# Patient Record
Sex: FEMALE | Race: WHITE | NOT HISPANIC OR LATINO | Employment: PART TIME | URBAN - METROPOLITAN AREA
[De-identification: names, ages, dates, MRNs, and addresses within clinical notes are randomized per-mention and may not be internally consistent; named-entity substitution may affect disease eponyms.]

---

## 2021-11-10 NOTE — PROGRESS NOTES
EMG and Nerve Conduction Studies    The complete report includes the data sheets.     Date of Study: November 15, 2021    Referring Provider DR. BOSWELL    History:RUE-NUMBNESS/TINGLING    Results:    1.  The right median sensory distal latency is within normal limits the right median palmar latency is prolonged above the upper limit of normal and is significantly prolonged compared to the ulnar palmar latency.  The right median motor nerve conduction study is normal.    2.  The ulnar sensory and motor nerve conduction studies were normal.    3.  EMG of the muscles examined in the right C5-T1 myotomes were normal.    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  There is no evidence of focal ulnar neuropathy at the elbow or neurogenic change in any muscle examined in the right upper extremity in the C5-T1 myotomes.      Electronically signed by :    Joseph Seipel, M.D.  November 15, 2021

## 2021-11-15 ENCOUNTER — PROCEDURE VISIT (OUTPATIENT)
Dept: NEUROLOGY | Facility: CLINIC | Age: 31
End: 2021-11-15

## 2021-11-15 VITALS
SYSTOLIC BLOOD PRESSURE: 111 MMHG | WEIGHT: 183 LBS | BODY MASS INDEX: 33.68 KG/M2 | HEART RATE: 97 BPM | TEMPERATURE: 97.1 F | DIASTOLIC BLOOD PRESSURE: 78 MMHG | HEIGHT: 62 IN

## 2021-11-15 DIAGNOSIS — G56.03 BILATERAL CARPAL TUNNEL SYNDROME: ICD-10-CM

## 2021-11-15 PROBLEM — Z79.899 LONG-TERM USE OF HIGH-RISK MEDICATION: Status: ACTIVE | Noted: 2021-09-23

## 2021-11-15 PROBLEM — G40.909 EPILEPSY UNDETERMINED AS TO FOCAL OR GENERALIZED (HCC): Status: ACTIVE | Noted: 2021-09-23

## 2021-11-15 PROCEDURE — 95886 MUSC TEST DONE W/N TEST COMP: CPT | Performed by: PSYCHIATRY & NEUROLOGY

## 2021-11-15 PROCEDURE — 95908 NRV CNDJ TST 3-4 STUDIES: CPT | Performed by: PSYCHIATRY & NEUROLOGY

## 2021-11-15 RX ORDER — NAPROXEN 375 MG/1
TABLET, DELAYED RELEASE ORAL
COMMUNITY
Start: 2021-10-09

## 2021-11-15 RX ORDER — CYCLOBENZAPRINE HCL 5 MG
5 TABLET ORAL
COMMUNITY
Start: 2021-09-29

## 2021-11-15 RX ORDER — ARIPIPRAZOLE 5 MG/1
5 TABLET ORAL DAILY
COMMUNITY
Start: 2021-10-07 | End: 2022-10-07

## 2021-11-15 RX ORDER — ONDANSETRON 4 MG/1
TABLET, FILM COATED ORAL
COMMUNITY
Start: 2021-10-25

## 2021-11-15 RX ORDER — LEVETIRACETAM 500 MG/1
500 TABLET ORAL
COMMUNITY
Start: 2021-09-23 | End: 2022-09-23

## 2021-11-15 RX ORDER — LIDOCAINE 50 MG/G
PATCH TOPICAL
COMMUNITY
Start: 2021-10-10

## 2021-11-15 RX ORDER — DULOXETIN HYDROCHLORIDE 60 MG/1
CAPSULE, DELAYED RELEASE ORAL
COMMUNITY
Start: 2021-06-11

## 2021-11-15 RX ORDER — FOLIC ACID 1 MG/1
2 TABLET ORAL DAILY
COMMUNITY
Start: 2021-10-25

## 2021-11-15 RX ORDER — SUMATRIPTAN 50 MG/1
TABLET, FILM COATED ORAL
COMMUNITY
Start: 2021-10-25

## 2021-11-15 RX ORDER — RIZATRIPTAN BENZOATE 10 MG/1
TABLET ORAL
COMMUNITY
Start: 2021-10-28

## 2021-11-15 RX ORDER — DOXYCYCLINE 100 MG/1
CAPSULE ORAL
COMMUNITY
Start: 2021-08-19

## 2021-11-15 RX ORDER — AMITRIPTYLINE HYDROCHLORIDE 10 MG/1
10 TABLET, FILM COATED ORAL DAILY
COMMUNITY
Start: 2021-05-28 | End: 2022-05-28

## 2021-11-15 RX ORDER — PSEUDOEPHEDRINE HCL 30 MG
TABLET ORAL
COMMUNITY
Start: 2021-10-09

## 2021-11-15 RX ORDER — METHOCARBAMOL 500 MG/1
500 TABLET, FILM COATED ORAL 3 TIMES DAILY
COMMUNITY
Start: 2021-11-14 | End: 2021-11-24

## 2021-11-15 RX ORDER — ERGOCALCIFEROL 1.25 MG/1
CAPSULE ORAL
COMMUNITY
Start: 2021-09-22

## 2022-08-29 ENCOUNTER — TREATMENT (OUTPATIENT)
Dept: PHYSICAL THERAPY | Facility: CLINIC | Age: 32
End: 2022-08-29

## 2022-08-29 DIAGNOSIS — G89.29 CHRONIC LOW BACK PAIN, UNSPECIFIED BACK PAIN LATERALITY, UNSPECIFIED WHETHER SCIATICA PRESENT: ICD-10-CM

## 2022-08-29 DIAGNOSIS — M46.1 SACROILIITIS: Primary | ICD-10-CM

## 2022-08-29 DIAGNOSIS — M54.50 CHRONIC LOW BACK PAIN, UNSPECIFIED BACK PAIN LATERALITY, UNSPECIFIED WHETHER SCIATICA PRESENT: ICD-10-CM

## 2022-08-29 PROCEDURE — 97110 THERAPEUTIC EXERCISES: CPT | Performed by: PHYSICAL THERAPIST

## 2022-08-29 PROCEDURE — 97161 PT EVAL LOW COMPLEX 20 MIN: CPT | Performed by: PHYSICAL THERAPIST

## 2022-08-29 NOTE — PROGRESS NOTES
Physical Therapy Initial Evaluation and Plan of Care    Patient: Leola Valenzuela   : 1990  Diagnosis/ICD-10 Code:  Sacroiliitis (HCC) [M46.1]  Referring practitioner: Wade Bill MD   Outcome Measure: Modified Oswestry:    Subjective Evaluation    History of Present Illness  Mechanism of injury: Pt reports to therapy with LBP that started several years ago following repeated falls on concrete. Pt reports that her pain has gradual worsened over the years and had an epidural a few months ago with little to no relief. Pt reports that she has numbness and tingling that terminated inferior to the knee. Pt reports getting decent sleep as long as she has a heating pad.       Aggravating factors: bending, sweeping, mopping, cleaning, physical activity. Prolonged sitting, prolonged standing, ambulation    Alleviating factors: sitting      PMHx:anxiety, depression, headaches, seizures- controlled with medication, hx of tobacco use, hx of marijuana use    Quality of life: good    Pain  Current pain ratin  At best pain ratin  At worst pain rating: 10    Patient Goals  Patient goals for therapy: increased motion, decreased pain, increased strength, independence with ADLs/IADLs, return to sport/leisure activities and improved balance             Objective        Special Questions  Patient is experiencing night pain, headaches and tinnitus.       Postural Observations  Seated posture: fair  Standing posture: fair        Palpation   Left   Hypertonic in the erector spinae, lumbar interspinals, lumbar paraspinals and quadratus lumborum.   Tenderness of the erector spinae, lumbar interspinals, lumbar paraspinals and quadratus lumborum.     Right   Hypertonic in the erector spinae, lumbar interspinals, lumbar paraspinals and quadratus lumborum. Tenderness of the erector spinae, lumbar interspinals, lumbar paraspinals and quadratus lumborum.     Tenderness     Left Hip   Tenderness in the PSIS.     Right Hip    Tenderness in the PSIS.     Neurological Testing     Sensation     Lumbar   Left   Intact: light touch    Right   Intact: light touch    Reflexes   Left   Patellar (L4): brisk (3+)  Achilles (S1): normal (2+)    Right   Patellar (L4): brisk (3+)  Achilles (S1): normal (2+)    Active Range of Motion     Additional Active Range of Motion Details  Flex:min impaired + pain  Ext:  WFL + pain  R SB: WFL + pain  L SB:WFL - pain  R ROT:WFL - pain  L ROT: WFL - pain    Strength/Myotome Testing     Left Hip   Planes of Motion   Flexion: 4  External rotation: 4-  Internal rotation: 4-    Right Hip   Planes of Motion   Flexion: 5  External rotation: 4+  Internal rotation: 4+    Left Knee   Flexion: 4  Extension: 4    Right Knee   Flexion: 5  Extension: 5    Left Ankle/Foot   Dorsiflexion: 4+  Plantar flexion: 4+    Right Ankle/Foot   Dorsiflexion: 5  Plantar flexion: 5    Additional Strength Details  Pain with all LLE MMT     Tests       Thoracic   Positive slump.     Lumbar     Left   Positive passive SLR and quadrant.   Negative crossed SLR.     Right   Positive quadrant.   Negative crossed SLR and passive SLR.     Left Pelvic Girdle/Sacrum   Positive: gapping.     Left Hip   Positive Gillet's.     Right Hip   Positive Gillet's.      General Comments     Lumbar Comments  LAD increased pain on BLE, L side gapping provided pain relief         Assessment & Plan     Assessment  Impairments: abnormal coordination, abnormal gait, abnormal muscle firing, abnormal muscle tone, abnormal or restricted ROM, activity intolerance, impaired balance, impaired physical strength, lacks appropriate home exercise program and pain with function  Functional Limitations: carrying objects, lifting, sleeping, walking, pulling, pushing, uncomfortable because of pain, moving in bed, sitting, standing, stooping, reaching behind back, reaching overhead and unable to perform repetitive tasks  Assessment details: Patient is a 32 y.o. year old female who  presents to therapy with sacroiliitis.  she presents with significant impairments including decreased lumbar ROM with pain, decreased BLE strength, decreased standing, sitting, walking , and activity tolerance, impaired Modified Oswestry score, and pain. Impairments affect ADL/IADL's, functional activities, recreational activities, and community activities. Pt will benefit from skilled physical therapy to address impairments, decrease pain and restore function.  Prognosis: good    Goals  Plan Goals: Pt presents to PT with symptoms consistent with sacroiliitis.  Pt would benefit from skilled PT intervention to address the deficits noted.     SHORT TERM GOALS: Time for Goal Achievement: 4 weeks    1.  Patient to be compliant and progression of HEP                             2.  Pain level < 8/10 at worst with mentioned activities to improve function  3.  Increased thoracic, lumbar and SIJ mobility to allow for increased lumbar AROM with less pain.  4.  Increased lumbar AROM to by 25% in all planes to allow for increased ease with sit-stand transfers and functional activities    LONG TERM GOALS: Time for Goal Achievement: 2 months  1.  Pt. to score < 25 % on Back Index  2.  Pain level < 5/10 with all listed activities to return to normal.  3.  Lumbar AROM to WFL to allow for return to household & recreational activities w/o increased symptoms  4.  (B) LE and lower abdominal strength to 5/5 to allow for pushing, pulling and activities to occur without pain (driving, sitting, household  & Job requirements)    Plan  Therapy options: will be seen for skilled therapy services  Planned modality interventions: cryotherapy, electrical stimulation/Russian stimulation, high voltage pulsed current (pain management), thermotherapy (hydrocollator packs), TENS, traction and ultrasound  Planned therapy interventions: abdominal trunk stabilization, ADL retraining, balance/weight-bearing training, body mechanics training, fine motor  coordination training, flexibility, functional ROM exercises, gait training, home exercise program, IADL retraining, joint mobilization, therapeutic activities, stretching, spinal/joint mobilization, strengthening, soft tissue mobilization, postural training, neuromuscular re-education, motor coordination training, manual therapy and transfer training  Duration in visits: 16  Treatment plan discussed with: patient        History # of Personal Factors and/or Comorbidities: HIGH (3+)  Examination of Body System(s): # of elements: LOW (1-2)  Clinical Presentation: UNSTABLE   Clinical Decision Making: LOW     Timed:         Manual Therapy:    5     mins  32648;     Therapeutic Exercise:    10     mins  03883;     Neuromuscular Basilio:        mins  45703;    Therapeutic Activity:         mins  09806;     Gait Training:           mins  00726;     Ultrasound:          mins  76733;    Ionto                                   mins   18676  Self Care                            mins   99916        Un-Timed:  Electrical Stimulation:         mins  55099 ( );  Dry Needling          mins self-pay  Traction          mins 05509  Low Eval     15     Mins  26422  Mod Eval          Mins  11260  High Eval                            Mins  81646  Re-Eval                               mins  45515        Timed Treatment:   15   mins   Total Treatment:     30   mins  PT SIGNATURE: Raquel Camarena PT, DPT    DATE TREATMENT INITIATED: 8/29/2022    Initial Certification  Certification Period: 11/27/2022  I certify that the therapy services are furnished while this patient is under my care.  The services outlined above are required by this patient, and will be reviewed every 90 days.     PHYSICIAN: Wade Bill MD      DATE:     Please sign and return via fax to [phone].. Thank you, [facility].

## 2022-09-02 ENCOUNTER — TREATMENT (OUTPATIENT)
Dept: PHYSICAL THERAPY | Facility: CLINIC | Age: 32
End: 2022-09-02

## 2022-09-02 DIAGNOSIS — M54.50 CHRONIC LOW BACK PAIN, UNSPECIFIED BACK PAIN LATERALITY, UNSPECIFIED WHETHER SCIATICA PRESENT: ICD-10-CM

## 2022-09-02 DIAGNOSIS — G89.29 CHRONIC LOW BACK PAIN, UNSPECIFIED BACK PAIN LATERALITY, UNSPECIFIED WHETHER SCIATICA PRESENT: ICD-10-CM

## 2022-09-02 DIAGNOSIS — M46.1 SACROILIITIS: Primary | ICD-10-CM

## 2022-09-02 PROCEDURE — 97110 THERAPEUTIC EXERCISES: CPT | Performed by: PHYSICAL THERAPIST

## 2022-09-02 PROCEDURE — 97530 THERAPEUTIC ACTIVITIES: CPT | Performed by: PHYSICAL THERAPIST

## 2022-09-02 NOTE — PROGRESS NOTES
Physical Therapy Daily Progress Note    VISIT#: 2    Subjective   Leola Valenzuela reports that she was sore  Following her intial evaluation but has been doing well since  Pain Rating (0/10): 2    Objective     See Exercise, Manual, and Modality Logs for complete treatment.     Patient Education: progression of therapy and POC    Assessment/Plan   Pt progressed in functional core strengthening and stability with minimal to no increase in pain throughout the session. Pt was provided with an updated HEP.     Plan  Progress per Plan of Care and Progress strengthening /stabilization /functional activity            Timed:         Manual Therapy:         mins  08437;     Therapeutic Exercise:    25     mins  63031;     Neuromuscular Basilio:        mins  42652;    Therapeutic Activity:     15     mins  80024;     Gait Training:           mins  62467;     Ultrasound:          mins  62200;    Ionto                                   mins   52198  Self Care                            mins   19476    Un-Timed:  Electrical Stimulation:         mins  33080 ( );  Dry Needling          mins self-pay  Traction          mins 81563  Low Eval         Mins  35917  Mod Eval          Mins  50332  High Eval                            Mins  41320  Re-Eval                               mins  31909    Timed Treatment:   40   mins   Total Treatment:     40   mins    Raquel Camarena PT, DPT  Physical Therapist

## 2022-09-12 ENCOUNTER — TREATMENT (OUTPATIENT)
Dept: PHYSICAL THERAPY | Facility: CLINIC | Age: 32
End: 2022-09-12

## 2022-09-12 DIAGNOSIS — M46.1 SACROILIITIS: Primary | ICD-10-CM

## 2022-09-12 DIAGNOSIS — M54.50 CHRONIC LOW BACK PAIN, UNSPECIFIED BACK PAIN LATERALITY, UNSPECIFIED WHETHER SCIATICA PRESENT: ICD-10-CM

## 2022-09-12 DIAGNOSIS — G89.29 CHRONIC LOW BACK PAIN, UNSPECIFIED BACK PAIN LATERALITY, UNSPECIFIED WHETHER SCIATICA PRESENT: ICD-10-CM

## 2022-09-12 PROCEDURE — 97110 THERAPEUTIC EXERCISES: CPT | Performed by: PHYSICAL THERAPIST

## 2022-09-12 NOTE — PROGRESS NOTES
Physical Therapy Daily Progress Note    VISIT#: 3    Subjective   Leola Valenzuela reports that she is doing well today and is tolerating her exercises well  Pain Rating (0/10): 3    Objective     See Exercise, Manual, and Modality Logs for complete treatment.     Patient Education: progression of therapy and POC    Assessment/Plan   Pt continues to progress strengthening, stability and activity tolerance with pt continuing to increase the amount of exercises with minimal to no increase in pain    Plan  Progress per Plan of Care and Progress strengthening /stabilization /functional activity            Timed:         Manual Therapy:         mins  02658;     Therapeutic Exercise:    40     mins  91606;     Neuromuscular Basilio:        mins  31725;    Therapeutic Activity:          mins  98322;     Gait Training:           mins  43227;     Ultrasound:          mins  08786;    Ionto                                   mins   10435  Self Care                            mins   87516    Un-Timed:  Electrical Stimulation:         mins  00477 ( );  Dry Needling          mins self-pay  Traction          mins 99349  Low Eval          Mins  53061  Mod Eval          Mins  08056  High Eval                            Mins  64672  Re-Eval                               mins  81910    Timed Treatment:   40   mins   Total Treatment:     40   mins    Raquel Camarena PT, DPT  Physical Therapist

## 2022-09-14 ENCOUNTER — TREATMENT (OUTPATIENT)
Dept: PHYSICAL THERAPY | Facility: CLINIC | Age: 32
End: 2022-09-14

## 2022-09-14 DIAGNOSIS — M46.1 SACROILIITIS: Primary | ICD-10-CM

## 2022-09-14 DIAGNOSIS — G89.29 CHRONIC LOW BACK PAIN, UNSPECIFIED BACK PAIN LATERALITY, UNSPECIFIED WHETHER SCIATICA PRESENT: ICD-10-CM

## 2022-09-14 DIAGNOSIS — M54.50 CHRONIC LOW BACK PAIN, UNSPECIFIED BACK PAIN LATERALITY, UNSPECIFIED WHETHER SCIATICA PRESENT: ICD-10-CM

## 2022-09-14 PROCEDURE — 97110 THERAPEUTIC EXERCISES: CPT | Performed by: PHYSICAL THERAPIST

## 2022-09-14 PROCEDURE — 97530 THERAPEUTIC ACTIVITIES: CPT | Performed by: PHYSICAL THERAPIST

## 2022-09-14 NOTE — PROGRESS NOTES
Physical Therapy Daily Progress Note    VISIT#: 4    Subjective   Leola Valenzuela reports that she is doing well today with no new complaints. Pt reports that her back has been   Pain Rating (0/10): 1    Objective     See Exercise, Manual, and Modality Logs for complete treatment.     Patient Education: progression of therapy and POC    Assessment/Plan   Pt continues to progress functional core strengthening with greater amount of exercises performed in standing today as compared to previously.     Plan  Progress per Plan of Care and Progress strengthening /stabilization /functional activity            Timed:         Manual Therapy:         mins  60822;     Therapeutic Exercise:    35     mins  54888;     Neuromuscular Basilio:        mins  40427;    Therapeutic Activity:     10     mins  87651;     Gait Training:           mins  29152;     Ultrasound:          mins  27662;    Ionto                                   mins   48617  Self Care                            mins   96750    Un-Timed:  Electrical Stimulation:         mins  94899 ( );  Dry Needling          mins self-pay  Traction          mins 36529  Low Eval          Mins  07750  Mod Eval          Mins  49879  High Eval                            Mins  99968  Re-Eval                               mins  98192    Timed Treatment:   45   mins   Total Treatment:     45   mins    Raquel Camarena PT, DPT  Physical Therapist

## 2022-09-19 ENCOUNTER — TREATMENT (OUTPATIENT)
Dept: PHYSICAL THERAPY | Facility: CLINIC | Age: 32
End: 2022-09-19

## 2022-09-19 DIAGNOSIS — M54.50 CHRONIC LOW BACK PAIN, UNSPECIFIED BACK PAIN LATERALITY, UNSPECIFIED WHETHER SCIATICA PRESENT: ICD-10-CM

## 2022-09-19 DIAGNOSIS — G89.29 CHRONIC LOW BACK PAIN, UNSPECIFIED BACK PAIN LATERALITY, UNSPECIFIED WHETHER SCIATICA PRESENT: ICD-10-CM

## 2022-09-19 DIAGNOSIS — M46.1 SACROILIITIS: Primary | ICD-10-CM

## 2022-09-19 PROCEDURE — 97110 THERAPEUTIC EXERCISES: CPT | Performed by: PHYSICAL THERAPIST

## 2022-09-19 PROCEDURE — 97530 THERAPEUTIC ACTIVITIES: CPT | Performed by: PHYSICAL THERAPIST

## 2022-09-19 NOTE — PROGRESS NOTES
Physical Therapy Daily Progress Note    VISIT#: 5    Subjective   Leola Valenzuela reports that she is doing better and is going to try to rearrange her living room later today  Pain Rating (0/10): 0    Objective     See Exercise, Manual, and Modality Logs for complete treatment.     Patient Education: progression of therapy, POC, and proper lifting mechanics.     Assessment/Plan   Pt continues to progress functional core strengthening, BLE strengthening, and activity tolerance with minimal to no increase in pain throughout the session.     Plan  Progress per Plan of Care and Progress strengthening /stabilization /functional activity            Timed:         Manual Therapy:         mins  79730;     Therapeutic Exercise:    20     mins  02895;     Neuromuscular Basilio:        mins  97976;    Therapeutic Activity:     25     mins  32217;     Gait Training:           mins  75846;     Ultrasound:          mins  95990;    Ionto                                   mins   01389  Self Care                            mins   82000    Un-Timed:  Electrical Stimulation:         mins  46491 ( );  Dry Needling          mins self-pay  Traction          mins 40575  Low Eval          Mins  71152  Mod Eval          Mins  57358  High Eval                            Mins  42385  Re-Eval                               mins  43766    Timed Treatment:   45   mins   Total Treatment:     45   mins    Raquel Camarena PT, DPT  Physical Therapist

## 2022-09-22 ENCOUNTER — TELEPHONE (OUTPATIENT)
Dept: PHYSICAL THERAPY | Facility: CLINIC | Age: 32
End: 2022-09-22

## 2022-09-27 ENCOUNTER — TREATMENT (OUTPATIENT)
Dept: PHYSICAL THERAPY | Facility: CLINIC | Age: 32
End: 2022-09-27

## 2022-09-27 DIAGNOSIS — M54.50 CHRONIC LOW BACK PAIN, UNSPECIFIED BACK PAIN LATERALITY, UNSPECIFIED WHETHER SCIATICA PRESENT: ICD-10-CM

## 2022-09-27 DIAGNOSIS — M46.1 SACROILIITIS: Primary | ICD-10-CM

## 2022-09-27 DIAGNOSIS — G89.29 CHRONIC LOW BACK PAIN, UNSPECIFIED BACK PAIN LATERALITY, UNSPECIFIED WHETHER SCIATICA PRESENT: ICD-10-CM

## 2022-09-27 PROCEDURE — 97110 THERAPEUTIC EXERCISES: CPT | Performed by: PHYSICAL THERAPIST

## 2022-09-27 PROCEDURE — 97530 THERAPEUTIC ACTIVITIES: CPT | Performed by: PHYSICAL THERAPIST

## 2022-09-27 NOTE — PROGRESS NOTES
Re-Assessment / Progress Note    Patient: Leola Valenzuela   : 1990  Diagnosis/ICD-10 Code:  Sacroiliitis (HCC) [M46.1]  Referring practitioner: Wade Bill MD  Date of Initial Visit: Episode Type: THERAPY  Noted: 2022    Today's Date: 2022  Patient seen for 6 sessions.      Subjective:   Leola Valenzuela reports: that she is unable to perform forward flexion repeatedly and also is still having issues with prolonged standing but is improving overall tolerance with no pain at this time  Subjective Questionnaire: Oswestry:   Clinical Progress: improved  Home Program Compliance: Yes  Treatment has included: therapeutic exercise, neuromuscular re-education, manual therapy, therapeutic activity and moist heat    Subjective   Objective     Active Range of Motion     Additional Active Range of Motion Details  Flex:min impaired + pain  Ext:  WFL + pain  R SB: WFL- pain  L SB:WFL - pain  R ROT:WFL - pain  L ROT: WFL - pain    Strength/Myotome Testing     Left Hip   Planes of Motion   Flexion:5  External rotation: 5  Internal rotation: 4+    Right Hip   Planes of Motion   Flexion: 5  External rotation: 5  Internal rotation: 5    Left Knee   Flexion:5  Extension: 5    Right Knee   Flexion: 5  Extension: 5    Left Ankle/Foot   Dorsiflexion: 5  Plantar flexion:5    Right Ankle/Foot   Dorsiflexion: 5  Plantar flexion: 5    Tests     Lumbar     Left   Positive passive SLR and quadrant.   Negative crossed SLR.     Right   Negative crossed SLR and passive SLR.  quadrant.     Left Hip   Positive Gillet's.     Right Hip   Positive Gillet's.      General Comments     Lumbar Comments  LAD increased pain on BLE, L side gapping provided pain relief       Assessment/Plan       Assessment & Plan     Assessment  Assessment details: Patient is a 32 y.o. year old female who presents to therapy with sacroiliitis.At initial evaluation  she presented with significant impairments including decreased lumbar ROM with pain,  decreased BLE strength, decreased standing, sitting, walking , and activity tolerance, impaired Modified Oswestry score, and pain. Pt has demonstrated improvements in all areas with strength the most noted improvements, but pt also reports increased tolerance for sitting, standing, walking, and activity with her pain decreasing, but pt continues to have impairments in theses areas.  Impairments affect ADL/IADL's, functional activities, recreational activities, and community activities. Pt will benefit from continued skilled physical therapy to address impairments, decrease pain and restore function.  Prognosis: good    Goals  Plan Goals: Pt presents to PT with symptoms consistent with sacroiliitis.  Pt would benefit from skilled PT intervention to address the deficits noted.     SHORT TERM GOALS: Time for Goal Achievement: 4 weeks    1.  Patient to be compliant and progression of HEP - MET                            2.  Pain level < 8/10 at worst with mentioned activities to improve function- MET  3.  Increased thoracic, lumbar and SIJ mobility to allow for increased lumbar AROM with less pain.- MET  4.  Increased lumbar AROM to by 25% in all planes to allow for increased ease with sit-stand transfers and functional activities- MET    LONG TERM GOALS: Time for Goal Achievement: 2 months  1.  Pt. to score < 25 % on Back Index- NOT MET  2.  Pain level < 5/10 with all listed activities to return to normal.- NOT MET  3.  Lumbar AROM to WFL to allow for return to household & recreational activities w/o increased symptoms  4.  (B) LE and lower abdominal strength to 5/5 to allow for pushing, pulling and activities to occur without pain (driving, sitting, household  & Job requirements)- NOT MET      Progress toward previous goals: Partially Met  Recommendations: Continue as planned  Timeframe: 1 month  Prognosis to achieve goals: good    PT Signature: Raquel Camarena PT, DPT            [place] Lic. # [id number]        Based upon  review of the patient's progress and continued therapy plan, it is my medical opinion that Leloa Valenzuela should continue physical therapy treatment at [facility]  [address]  [place] [address] [phone].    Signature: __________________________________  Wade Bill MD    Timed:         Manual Therapy:         mins  19889;     Therapeutic Exercise:    30     mins  21751;     Neuromuscular Basilio:        mins  26097;    Therapeutic Activity:     15     mins  24330;     Gait Training:           mins  32723;     Ultrasound:          mins  98275;    Ionto                                   mins   59841  Self Care                            mins   75543        Un-Timed:  Electrical Stimulation:        mins  60102 ( );  Dry Needling          mins self-pay  Traction          mins 21817  Low Eval          Mins  90398  Mod Eval          Mins  16816  High Eval                            Mins  86367  Re-Eval                               mins  07726        Timed Treatment:   45   mins   Total Treatment:     45   mins

## 2022-10-04 ENCOUNTER — TREATMENT (OUTPATIENT)
Dept: PHYSICAL THERAPY | Facility: CLINIC | Age: 32
End: 2022-10-04

## 2022-10-04 DIAGNOSIS — G89.29 CHRONIC LOW BACK PAIN, UNSPECIFIED BACK PAIN LATERALITY, UNSPECIFIED WHETHER SCIATICA PRESENT: ICD-10-CM

## 2022-10-04 DIAGNOSIS — M54.50 CHRONIC LOW BACK PAIN, UNSPECIFIED BACK PAIN LATERALITY, UNSPECIFIED WHETHER SCIATICA PRESENT: ICD-10-CM

## 2022-10-04 DIAGNOSIS — M46.1 SACROILIITIS: Primary | ICD-10-CM

## 2022-10-04 PROCEDURE — 97530 THERAPEUTIC ACTIVITIES: CPT | Performed by: PHYSICAL THERAPIST

## 2022-10-04 PROCEDURE — 97110 THERAPEUTIC EXERCISES: CPT | Performed by: PHYSICAL THERAPIST

## 2022-10-04 NOTE — PROGRESS NOTES
Physical Therapy Daily Treatment Note    VISIT#: 7    Subjective   Leola Valenzuela reports that she is doing well today with no new complaints  Pain Rating (0/10): 1    Objective     See Exercise, Manual, and Modality Logs for complete treatment.     Patient Education: Progress of therapy and POC    Assessment/Plan  Pt continues to progress strengthening, stability, and activity tolerance with majority of exercises performed in standing with emphasis on core activation.     Plan  Progress per Plan of Care and Progress strengthening /stabilization /functional activity            Timed:         Manual Therapy:         mins  85594;     Therapeutic Exercise:    30     mins  98794;     Neuromuscular Basilio:        mins  37723;    Therapeutic Activity:     15     mins  81497;     Gait Training:           mins  71843;     Ultrasound:          mins  19952;    Ionto                                   mins   50590  Self Care                            mins   41990    Un-Timed:  Electrical Stimulation:         mins  51731 ( );  Dry Needling          mins self-pay  Traction          mins 62694  Low Eval          Mins  76534  Mod Eval          Mins  62816  High Eval                            Mins  74123  Re-Eval                               mins  54255    Timed Treatment:   45   mins   Total Treatment:     45   mins    Raquel Camarena PT, DPT  Physical Therapist

## 2022-10-06 ENCOUNTER — TREATMENT (OUTPATIENT)
Dept: PHYSICAL THERAPY | Facility: CLINIC | Age: 32
End: 2022-10-06

## 2022-10-06 DIAGNOSIS — G89.29 CHRONIC LOW BACK PAIN, UNSPECIFIED BACK PAIN LATERALITY, UNSPECIFIED WHETHER SCIATICA PRESENT: ICD-10-CM

## 2022-10-06 DIAGNOSIS — M54.50 CHRONIC LOW BACK PAIN, UNSPECIFIED BACK PAIN LATERALITY, UNSPECIFIED WHETHER SCIATICA PRESENT: ICD-10-CM

## 2022-10-06 DIAGNOSIS — M46.1 SACROILIITIS: Primary | ICD-10-CM

## 2022-10-06 PROCEDURE — 97530 THERAPEUTIC ACTIVITIES: CPT | Performed by: PHYSICAL THERAPIST

## 2022-10-06 PROCEDURE — 97112 NEUROMUSCULAR REEDUCATION: CPT | Performed by: PHYSICAL THERAPIST

## 2022-10-06 PROCEDURE — 97110 THERAPEUTIC EXERCISES: CPT | Performed by: PHYSICAL THERAPIST

## 2022-10-06 NOTE — PROGRESS NOTES
Physical Therapy Daily Treatment Note    VISIT#: 8    Subjective   Leola Valenzuela reports that she is doing well today and did well following her previous session  Pain Rating (0/10): 1    Objective     See Exercise, Manual, and Modality Logs for complete treatment.     Patient Education: Progress of therapy and POC    Assessment/Plan  Pt continues to progress functional core strengthening with increased difficulty of exercises performed with minimal to no increase in pain noted    Plan  Progress per Plan of Care and Progress strengthening /stabilization /functional activity            Timed:         Manual Therapy:         mins  76797;     Therapeutic Exercise:    15     mins  60195;     Neuromuscular Basilio:    10    mins  79107;    Therapeutic Activity:     15     mins  72512;     Gait Training:           mins  47795;     Ultrasound:          mins  75868;    Ionto                                   mins   90340  Self Care                            mins   33816    Un-Timed:  Electrical Stimulation:         mins  78090 ( );  Dry Needling          mins self-pay  Traction          mins 19690  Low Eval          Mins  42820  Mod Eval          Mins  07981  High Eval                            Mins  66479  Re-Eval                               mins  43760    Timed Treatment:   40   mins   Total Treatment:     40   mins    Raquel Camarena PT, DPT  Physical Therapist

## 2022-10-17 ENCOUNTER — TELEPHONE (OUTPATIENT)
Dept: PHYSICAL THERAPY | Facility: OTHER | Age: 32
End: 2022-10-17

## 2022-10-20 ENCOUNTER — TREATMENT (OUTPATIENT)
Dept: PHYSICAL THERAPY | Facility: CLINIC | Age: 32
End: 2022-10-20

## 2022-10-20 DIAGNOSIS — G89.29 CHRONIC LOW BACK PAIN, UNSPECIFIED BACK PAIN LATERALITY, UNSPECIFIED WHETHER SCIATICA PRESENT: ICD-10-CM

## 2022-10-20 DIAGNOSIS — M54.50 CHRONIC LOW BACK PAIN, UNSPECIFIED BACK PAIN LATERALITY, UNSPECIFIED WHETHER SCIATICA PRESENT: ICD-10-CM

## 2022-10-20 DIAGNOSIS — M46.1 SACROILIITIS: Primary | ICD-10-CM

## 2022-10-20 PROCEDURE — 97110 THERAPEUTIC EXERCISES: CPT | Performed by: PHYSICAL THERAPIST

## 2022-10-20 PROCEDURE — 97530 THERAPEUTIC ACTIVITIES: CPT | Performed by: PHYSICAL THERAPIST

## 2022-10-20 PROCEDURE — 97112 NEUROMUSCULAR REEDUCATION: CPT | Performed by: PHYSICAL THERAPIST

## 2022-10-20 NOTE — PROGRESS NOTES
Physical Therapy Daily Treatment Note    VISIT#: 9    Subjective   Leola Valenzuela reports that she is doing well today with no new complaints  Pain Rating (0/10): 1    Objective     See Exercise, Manual, and Modality Logs for complete treatment.     Patient Education: Progress of therapy and POC    Assessment/Plan  Pt continues to progress strengthening, stability, and activity tolerance in standing with rest breaks taken intermittently to minimize fatigue    Plan  Progress per Plan of Care and Progress strengthening /stabilization /functional activity            Timed:         Manual Therapy:         mins  48379;     Therapeutic Exercise:    15     mins  05948;     Neuromuscular Basilio:    10    mins  33470;    Therapeutic Activity:     15     mins  72159;     Gait Training:           mins  84714;     Ultrasound:          mins  83226;    Ionto                                   mins   80950  Self Care                            mins   32742    Un-Timed:  Electrical Stimulation:         mins  76605 ( );  Dry Needling          mins self-pay  Traction          mins 12121  Low Eval          Mins  95843  Mod Eval          Mins  71193  High Eval                            Mins  42988  Re-Eval                               mins  93451    Timed Treatment:   40   mins   Total Treatment:     40   mins    Raquel Camarena PT, DPT  Physical Therapist

## 2022-10-24 ENCOUNTER — TREATMENT (OUTPATIENT)
Dept: PHYSICAL THERAPY | Facility: CLINIC | Age: 32
End: 2022-10-24

## 2022-10-24 DIAGNOSIS — M54.50 CHRONIC LOW BACK PAIN, UNSPECIFIED BACK PAIN LATERALITY, UNSPECIFIED WHETHER SCIATICA PRESENT: ICD-10-CM

## 2022-10-24 DIAGNOSIS — G89.29 CHRONIC LOW BACK PAIN, UNSPECIFIED BACK PAIN LATERALITY, UNSPECIFIED WHETHER SCIATICA PRESENT: ICD-10-CM

## 2022-10-24 DIAGNOSIS — M46.1 SACROILIITIS: Primary | ICD-10-CM

## 2022-10-24 PROCEDURE — 97110 THERAPEUTIC EXERCISES: CPT | Performed by: PHYSICAL THERAPIST

## 2022-10-24 PROCEDURE — 97112 NEUROMUSCULAR REEDUCATION: CPT | Performed by: PHYSICAL THERAPIST

## 2022-10-24 PROCEDURE — 97530 THERAPEUTIC ACTIVITIES: CPT | Performed by: PHYSICAL THERAPIST

## 2022-10-24 NOTE — PROGRESS NOTES
Physical Therapy Daily Treatment Note    VISIT#: 10    Subjective   Leola Valenzuela reports that she has increased pain following increased activity and flexion over the weekend.   Pain Rating (0/10): 6    Objective     See Exercise, Manual, and Modality Logs for complete treatment.     Patient Education: Progress of therapy and POC    Assessment/Plan  Pt with higher pain levels today as compared to previous session with pt starting on the Nustep followed by functional core stabilization exercises in supine as she progressed to standing exercises again    Plan  Progress per Plan of Care and Progress strengthening /stabilization /functional activity            Timed:         Manual Therapy:         mins  37921;     Therapeutic Exercise:    15     mins  66369;     Neuromuscular Basilio:    15    mins  67202;    Therapeutic Activity:     15     mins  80732;     Gait Training:           mins  22035;     Ultrasound:          mins  21394;    Ionto                                   mins   74678  Self Care                            mins   47020    Un-Timed:  Electrical Stimulation:         mins  67845 ( );  Dry Needling          mins self-pay  Traction          mins 58049  Low Eval          Mins  71936  Mod Eval          Mins  26290  High Eval                            Mins  40822  Re-Eval                               mins  47558    Timed Treatment:   45   mins   Total Treatment:     45   mins    Raquel Camarena PT, DPT  Physical Therapist

## 2022-11-02 ENCOUNTER — TREATMENT (OUTPATIENT)
Dept: PHYSICAL THERAPY | Facility: CLINIC | Age: 32
End: 2022-11-02

## 2022-11-02 DIAGNOSIS — G89.29 CHRONIC LOW BACK PAIN, UNSPECIFIED BACK PAIN LATERALITY, UNSPECIFIED WHETHER SCIATICA PRESENT: ICD-10-CM

## 2022-11-02 DIAGNOSIS — M54.50 CHRONIC LOW BACK PAIN, UNSPECIFIED BACK PAIN LATERALITY, UNSPECIFIED WHETHER SCIATICA PRESENT: ICD-10-CM

## 2022-11-02 DIAGNOSIS — M46.1 SACROILIITIS: Primary | ICD-10-CM

## 2022-11-02 PROCEDURE — 97110 THERAPEUTIC EXERCISES: CPT | Performed by: PHYSICAL THERAPIST

## 2022-11-02 PROCEDURE — 97530 THERAPEUTIC ACTIVITIES: CPT | Performed by: PHYSICAL THERAPIST

## 2022-11-02 NOTE — PROGRESS NOTES
Physical Therapy Daily Treatment Note    VISIT#: 11    Subjective   Leola Valenzuela reports that she is continuing to have similar pain as her last session and is unsure why with pressure noted in her back.   Pain Rating (0/10): 5    Objective     See Exercise, Manual, and Modality Logs for complete treatment.     Patient Education: Progress of therapy and POC    Assessment/Plan  Pt performed greater amounts of gravity limited exercise for functional core strengthening, with progression into greater standing activities with minimal to no increase in pain throughout the day. Pt billed for one-on-one time.     Plan  Progress per Plan of Care and Progress strengthening /stabilization /functional activity            Timed:         Manual Therapy:         mins  87013;     Therapeutic Exercise:   15     mins  78386;     Neuromuscular Basilio:        mins  06187;    Therapeutic Activity:     15     mins  64254;     Gait Training:           mins  24614;     Ultrasound:          mins  99218;    Ionto                                   mins   14875  Self Care                            mins   96336    Un-Timed:  Electrical Stimulation:         mins  78259 ( );  Dry Needling          mins self-pay  Traction          mins 28595  Low Eval          Mins  43156  Mod Eval          Mins  61484  High Eval                            Mins  15123  Re-Eval                               mins  24751    Timed Treatment:   30   mins   Total Treatment:     30   mins    Raquel Camarena PT, DPT  Physical Therapist

## 2022-11-14 ENCOUNTER — TREATMENT (OUTPATIENT)
Dept: PHYSICAL THERAPY | Facility: CLINIC | Age: 32
End: 2022-11-14

## 2022-11-14 DIAGNOSIS — M46.1 SACROILIITIS: Primary | ICD-10-CM

## 2022-11-14 DIAGNOSIS — M54.50 CHRONIC LOW BACK PAIN, UNSPECIFIED BACK PAIN LATERALITY, UNSPECIFIED WHETHER SCIATICA PRESENT: ICD-10-CM

## 2022-11-14 DIAGNOSIS — G89.29 CHRONIC LOW BACK PAIN, UNSPECIFIED BACK PAIN LATERALITY, UNSPECIFIED WHETHER SCIATICA PRESENT: ICD-10-CM

## 2022-11-14 PROCEDURE — 97110 THERAPEUTIC EXERCISES: CPT | Performed by: PHYSICAL THERAPIST

## 2022-11-14 NOTE — PROGRESS NOTES
Re-Assessment / Progress Note    Patient: Leola Valenzuela   : 1990  Diagnosis/ICD-10 Code:  Sacroiliitis (HCC) [M46.1]  Referring practitioner: Wade Bill MD  Date of Initial Visit: Episode Type: THERAPY  Noted: 2022    Today's Date: 2022  Patient seen for 12 sessions.      Subjective:   Leola Valenzuela reports: that she is having a good day today with no pain noted today. Pt reports that the only times she has pain is with a lot of bending or heavy lifting.   Subjective Questionnaire: Oswestry:   Clinical Progress: improved  Home Program Compliance: Yes  Treatment has included: therapeutic exercise, neuromuscular re-education, manual therapy, therapeutic activity and moist heat    Subjective   Objective   Active Range of Motion     Additional Active Range of Motion Details  Flex:min impaired + pain  Ext:  WFL + pain  R SB: WFL- pain  L SB:WFL - pain  R ROT:WFL - pain  L ROT: WFL - pain    Strength/Myotome Testing     Left Hip   Planes of Motion   Flexion:5  External rotation: 5  Internal rotation: 5    Right Hip   Planes of Motion   Flexion: 5  External rotation: 5  Internal rotation: 5    Left Knee   Flexion:5  Extension: 5    Right Knee   Flexion: 5  Extension: 5    Left Ankle/Foot   Dorsiflexion: 5  Plantar flexion:5    Right Ankle/Foot   Dorsiflexion: 5  Plantar flexion: 5      Assessment/Plan        Assessment/Plan       Assessment & Plan     Assessment  Assessment details: Patient is a 32 y.o. year old female who presents to therapy with sacroiliitis.At initial evaluation  she presented with significant impairments including decreased lumbar ROM with pain, decreased BLE strength, decreased standing, sitting, walking , and activity tolerance, impaired Modified Oswestry score, and pain. Pt has demonstrated improvements in all areas with strength the most noted improvements, but pt also reports increased tolerance for sitting, standing, walking, and activity with her pain decreasing with  no pain noted today unless performing repeated flexion. Pt has reached maximal functional ability and is being DC home with HEP at this time. Pt expressed no concerns with DC.   Prognosis: good    Goals  Plan Goals: Pt presents to PT with symptoms consistent with sacroiliitis.  Pt would benefit from skilled PT intervention to address the deficits noted.     SHORT TERM GOALS: Time for Goal Achievement: 4 weeks    1.  Patient to be compliant and progression of HEP - MET            (9/27/22)                2.  Pain level < 8/10 at worst with mentioned activities to improve function- MET   (9/27/22)    3.  Increased thoracic, lumbar and SIJ mobility to allow for increased lumbar AROM with less pain.- MET   (9/27/22)    4.  Increased lumbar AROM to by 25% in all planes to allow for increased ease with sit-stand transfers and functional activities- MET   (9/27/22)      LONG TERM GOALS: Time for Goal Achievement: 2 months  1.  Pt. to score < 25 % on Back Index- NOT MET(11/14/22)  2.  Pain level < 5/10 with all listed activities to return to normal.-  MET (11/14/22)  3.  Lumbar AROM to WFL to allow for return to household & recreational activities w/o increased symptoms- Partially Met ( 11/14/22)  4.  (B) LE and lower abdominal strength to 5/5 to allow for pushing, pulling and activities to occur without pain (driving, sitting, household  & Job requirements)-  MET(11/14/22)      Progress toward previous goals: Partially Met  Recommendations: Discharge      PT Signature: Raquel Camarena PT, DPT            [place] Lic. # [id number]        Based upon review of the patient's progress and continued therapy plan, it is my medical opinion that Leola Valenzuela should continue physical therapy treatment at [facility]  [address]  [place] [address] [phone].    Signature: __________________________________  Wade Bill MD    Timed:         Manual Therapy:         mins   76704;     Therapeutic Exercise:    30     mins  74676;     Neuromuscular Basilio:        mins  48015;    Therapeutic Activity:          mins  88073;     Gait Training:           mins  90755;     Ultrasound:          mins  92481;    Ionto                                   mins   31914  Self Care                            mins   28017        Un-Timed:  Electrical Stimulation:         mins  63605 ( );  Dry Needling          mins self-pay  Traction          mins 71794  Low Eval          Mins  48785  Mod Eval          Mins  53329  High Eval                            Mins  77926  Re-Eval                               mins  91346        Timed Treatment:   30   mins   Total Treatment:     30   mins

## 2025-02-18 ENCOUNTER — DOCUMENTATION (OUTPATIENT)
Dept: PHYSICAL THERAPY | Facility: CLINIC | Age: 35
End: 2025-02-18
Payer: MEDICAID

## 2025-02-18 NOTE — PROGRESS NOTES
Discharge Summary  Discharge Summary from Physical Therapy Report      Date of Initial PT visit: 8/15/22  Number of Visits Seen: 12     Discharge Status of Patient:   SHORT TERM GOALS: Time for Goal Achievement: 4 weeks    1.  Patient to be compliant and progression of HEP - MET            (9/27/22)                2.  Pain level < 8/10 at worst with mentioned activities to improve function- MET   (9/27/22)    3.  Increased thoracic, lumbar and SIJ mobility to allow for increased lumbar AROM with less pain.- MET   (9/27/22)    4.  Increased lumbar AROM to by 25% in all planes to allow for increased ease with sit-stand transfers and functional activities- MET   (9/27/22)      LONG TERM GOALS: Time for Goal Achievement: 2 months  1.  Pt. to score < 25 % on Back Index- NOT MET(11/14/22)  2.  Pain level < 5/10 with all listed activities to return to normal.-  MET (11/14/22)  3.  Lumbar AROM to WFL to allow for return to household & recreational activities w/o increased symptoms- Partially Met ( 11/14/22)  4.  (B) LE and lower abdominal strength to 5/5 to allow for pushing, pulling and activities to occur without pain (driving, sitting, household  & Job requirements)-  MET(11/14/22)    Goals: Partially Met    Discharge Plan: Continue with current home exercise program as instructed        Date of Discharge 2/18/25        Raquel Camarena, PT, DPT  Physical Therapist